# Patient Record
(demographics unavailable — no encounter records)

---

## 2024-11-05 NOTE — REVIEW OF SYSTEMS
[Feeling Fatigued] : not feeling fatigued [Dyspnea on exertion] : dyspnea during exertion [Negative] : Respiratory [FreeTextEntry5] : Chronic

## 2024-11-05 NOTE — CARDIOLOGY SUMMARY
[de-identified] : Reviewed all testing below today:\par  May 2022: Sinus bradycardia NSST [de-identified] : Nuclear stress test with SPECT July 2021 using Lexiscan: No ischemic changes.  Diaphragmatic attenuation.  Moderate inferior, inferolateral and inferoseptal defects with ramon-infarct ischemia.  EF 51%. She declined cardiac cath for further eval at this time [de-identified] : July 2022: EF 45-50% Mildly dilated. LA Mild MR Ascending aorta 4cm. Endocardium not well visualized. Mildly reduced LVSF. Mild LVH Mild TR. Normal PASP. \par  July 2021: EF 45 to 50%.  PASP 24.  And LVE.  Indeterminate diastolic function. [de-identified] : CTA Coronaries: Calcium Score 48 No discrete luminal stenosis > 10 % LAD\par  LV mild hypokinesia with no dyskinetic segments identified. Atherosclerotic changes without significant stenosis [de-identified] : Carotid Doppler 7/2022 Bilateral intimal thickening. no significant changes since previous\par  Carotid ultrasound July 2021: R ICA velocities 50 to 69%.  Distal right ICA PSV was 150.  Incidental finding of a left thyroid nodule\par  Abdominal ultrasound, mild atherosclerosis.  No significant dilation\par  Abdominal aorta ultrasound 7/26/2021: Borderline proximal aorta dimensions.  Mild atherosclerosis seen throughout the abdominal aorta. [de-identified] : 6/2022: Total chol 152 LDL 79 HDL 57 Creat 0.81 K+ 4.6 Mag 2.2  Hgb 15.9 HCT 51.4 Plt 205 A1C 6.0% \par  -Lipid profile 7/22/2021: LDL 75, HDL 50.  TG 93.\par

## 2024-11-05 NOTE — ADDENDUM
[FreeTextEntry1] : Please note the patient was reviewed with the PA. I was physically present during the service of the patient I was directly involved in the management plan and recommendations of care provided to the patient.  I personally reviewed the history and physical exam and plan as documented by the PA above.  Irvin Ledbetter DO, FAC, [facility] Cardiologist 5/10/2024

## 2024-11-05 NOTE — DISCUSSION/SUMMARY
[FreeTextEntry1] : SARIAH GA is a 75 year old F   CAD: Minimal disease.  LDL near target.  Smoking cessation discussed.  Normal LV function on echocardiogram  Dyspnea on exertion is probably multifactorial.  Echocardiogram in past showed mildly reduced LVEF but normal PASP in 2021.  Repeat echo July 2022 and January 2023 showed improvement in EF into normal range.    Hypertension: Continue low-dose ARB and beta-blockers. Relief of anxiety, exercise, cutting back on salt, weight loss and smoking cessation would all help.   Patient understands that ; A home blood pressure log -reminded again.  Target blood pressure less than 130 over 80 mmHg.  Labs in October 2024 showed FBS of 126.  Normal LFT and creatinine.  Hemoglobin of 15.1.  We discussed fasting hyperglycemia and its implication.  She needs to lose weight, cut back on sweets and fast carbs and total calories.  Given her a lab slip to recheck A1c and FBS after 1 month.  COPD: She follows with pulmonary. Still smoking 1/2 ppd. She is wiling to try to quit.  Carotid artery disease: ~50% narrowing of distal TIERRA 7/2021.  On statin and aspirin as well as ARB treatments.  Carotid Doppler 7/2022 Bilateral intimal thickening. no significant changes since previous  Incidental thyroid nodule.  Followed endocrine.  Prediabetes: Reduction of carbs and weight loss as well as exercise would help.  Follow-up 6 months.  Thank you for this referral and allowing me to participate in the care of this patient.  If I can be of any further help or  if you have any questions, please do not hesitate to contact me   Sincerely,  Kit Nguyễn MD, FACC, [facility]

## 2025-06-13 NOTE — DISCUSSION/SUMMARY
[FreeTextEntry1] : SARIAH GA is a 76 year old M who presents today Jun 13, 2025 with the above history and the following active issues:   Nonobstructive CAD: Stable JURADO. Multifactorial. Follows with Pulmonary. CCTA 2024 with nonobstructive dz. No other associated exertional sx. Compliant with Atorvastatin, Toprol XL and ASA. Continue current medications. Smoking cessation discussed with her!  Carotid Dz: Mild on US in 2024. Continue Atorvastatin.   HLD: As above, continue Atorvastatin. Updated fasting lipid profile requested prior to f/u visit in 12/2025.   labile HTN: BP well controlled today at 122/76. Continue Losartan, Toprol XL.   dilated Asc Ao at 4cm in 2023. BPs are well controlled. Unfortunately, she continues to smoke cigarettes. Again, discussion regarding smoking cessation was had! F/u Echocardiography for aortic dimensions and routine surveillance monitoring.   pre-DM: Updated lab work has been requested prior to next visit.   COPD: Ongoing f/u with PCP.  Pt has ongoing f/u with Hematology Dr. Jones.   F/u Echo and OV in 12/2025.  Limitations of non-invasive testing reviewed. Red flag symptoms which would warrant sooner emergent evaluation reviewed with the patient. All questions and concerns were addressed and answered.   Sincerely,  Loli Balderas, [facility] Patient's history, testing, medications and any relative changes in plan of care reviewed with supervising MD: Dr. Kit Nguyễn  [EKG obtained to assist in diagnosis and management of assessed problem(s)] : EKG obtained to assist in diagnosis and management of assessed problem(s)

## 2025-06-13 NOTE — CARDIOLOGY SUMMARY
[de-identified] :  2022: Sinus bradycardia NSST   [de-identified] : July 2021 Nuke Lexiscan: No ischemic changes. Diaphragmatic attenuation. Moderate inferior, inferolateral and inferoseptal defects with ramon-infarct ischemia. EF 51%. She declined cardiac cath for further eval at this time.  [de-identified] : 1/2023 LVEF 50-55%, Mobile echodensity in RA seen on prior echos appears to be a thickened eustacian valve. 7/2022: EF 45-50% Mildly dilated. LA Mild MR Ascending aorta 4cm. Endocardium not well visualized. Mildly reduced LVSF. Mild LVH Mild TR. Normal PASP. AoRoot 3.9, Asc Ao 4, Arch 3cm.  7/2021: EF 45-50%. PASP 24. And LVE. Indeterminate diastolic function.  AoRoot 3.6, Asc Ao 3.9, Arch 3cm.  [de-identified] : CTA Coronaries 5/2024: Calcium Score 68. Emphysema. Nonobstructive CAD. Normal LV side and thickness.  CTA Coronaries 2022: Calcium score 48. No discrete luminal stenosis of >10%, LV hypokinesia with no dyskinetic segments. [de-identified] : Carotid 4/2024 No change from 2022, mild nonobstructive dz bilaterally.  Carotid 7/2022 bilateral intimal thickening.  Abd US 7/2021 Borderline pAorta dimensions, mild plaque throughout.  [de-identified] : 12/2024 Labs A1C 6.1%, avg glucose 128.  10/2024 Labs normal LFTs, LDL 69, HDL 61, Trigs 65. Normal CPK 93.  4/2024 Labs LDL 71, HDL 61, Trigs 59, K 4.7, Cr 0.94, normal LFTs. Elevated RBC and HCT. Stable Hgb. Normal Plt 206.

## 2025-06-13 NOTE — REVIEW OF SYSTEMS
[SOB] : shortness of breath [Dyspnea on exertion] : dyspnea during exertion [Negative] : Neurological

## 2025-06-13 NOTE — PHYSICAL EXAM
[No Acute Distress] : no acute distress [Obese] : obese [No Carotid Bruit] : no carotid bruit [Normal S1, S2] : normal S1, S2 [Wheeze ____] : wheeze [unfilled] [Normal Gait] : normal gait [No Edema] : no edema [Moves all extremities] : moves all extremities [Alert and Oriented] : alert and oriented

## 2025-06-13 NOTE — HISTORY OF PRESENT ILLNESS
[FreeTextEntry1] : Nannette is a 75yoF with PMHx: nonobstructive CAD (CTA 2024), carotid dz, dilated AscAo (4cm 2023), HLD, labile HTN, pre-DM, COPD, thyroid nodules. SoHx: Chronic smoking.   She was last seen 11/2025 with sx of JURADO particularly walking, stair climbing - sx were unchanged, presumable due to physical deconditioning and COPD exacerbation. At that time following with Dr. Bojorquez for Pulm.   She presents in clinic today for routine interval follow up. She has ongoing JURADO that remains unchanged. Unfortunately, she continues smoking. She becomes short of breath with walking, gardening, stair climbing. There are no associated sx.  There is no exertional chest pain, pressure or discomfort. There is no LE edema, PND or orthopnea. There are no symptomatic palpitations, lightheadedness, dizziness or syncope.   BP is well controlled at 122/76. Weight is stable. She has lost 2lbs since last seen. BMI 32kg/m2.  In office EKG shows SR, consistent with prior findings.   Labs provided for my review from 5/29/25 K 4.6, Mg 2.2, Phos 3.7, Cr 0.84, stable CBC with elevated HCT (chronic smoking),